# Patient Record
Sex: MALE | Race: WHITE
[De-identification: names, ages, dates, MRNs, and addresses within clinical notes are randomized per-mention and may not be internally consistent; named-entity substitution may affect disease eponyms.]

---

## 2021-03-14 ENCOUNTER — HOSPITAL ENCOUNTER (EMERGENCY)
Dept: HOSPITAL 83 - ED | Age: 61
LOS: 1 days | Discharge: ANOTHER HOSPITAL | End: 2021-03-15
Payer: SELF-PAY

## 2021-03-14 VITALS — HEIGHT: 70 IN | BODY MASS INDEX: 31.5 KG/M2 | WEIGHT: 220 LBS

## 2021-03-14 DIAGNOSIS — Z87.442: ICD-10-CM

## 2021-03-14 DIAGNOSIS — I63.9: Primary | ICD-10-CM

## 2021-03-14 DIAGNOSIS — I48.20: ICD-10-CM

## 2021-03-14 DIAGNOSIS — Z79.899: ICD-10-CM

## 2021-03-14 DIAGNOSIS — Z79.01: ICD-10-CM

## 2021-03-14 DIAGNOSIS — I10: ICD-10-CM

## 2021-03-14 PROBLEM — R29.90 STROKE-LIKE SYMPTOMS: Status: ACTIVE | Noted: 2021-03-14

## 2021-03-14 LAB
ALBUMIN SERPL-MCNC: 3.6 GM/DL (ref 3.1–4.5)
ALP SERPL-CCNC: 71 U/L (ref 45–117)
ALT SERPL W P-5'-P-CCNC: 30 U/L (ref 12–78)
APTT PPP: 24.7 SECONDS (ref 20–32.1)
AST SERPL-CCNC: 19 IU/L (ref 3–35)
BASOPHILS # BLD AUTO: 0 10*3/UL (ref 0–0.1)
BASOPHILS NFR BLD AUTO: 0.5 % (ref 0–1)
BUN SERPL-MCNC: 13 MG/DL (ref 7–24)
CHLORIDE SERPL-SCNC: 108 MMOL/L (ref 98–107)
CREAT SERPL-MCNC: 0.86 MG/DL (ref 0.7–1.3)
EOSINOPHIL # BLD AUTO: 0.2 10*3/UL (ref 0–0.4)
EOSINOPHIL # BLD AUTO: 2.1 % (ref 1–4)
ERYTHROCYTE [DISTWIDTH] IN BLOOD BY AUTOMATED COUNT: 13.2 % (ref 0–14.5)
HCT VFR BLD AUTO: 46.2 % (ref 42–52)
INR BLD: 1 (ref 2–3.5)
LYMPHOCYTES # BLD AUTO: 2.6 10*3/UL (ref 1.3–4.4)
LYMPHOCYTES NFR BLD AUTO: 31.5 % (ref 27–41)
MCH RBC QN AUTO: 29.1 PG (ref 27–31)
MCHC RBC AUTO-ENTMCNC: 32.9 G/DL (ref 33–37)
MCV RBC AUTO: 88.3 FL (ref 80–94)
MONOCYTES # BLD AUTO: 0.7 10*3/UL (ref 0.1–1)
MONOCYTES NFR BLD MANUAL: 8.2 % (ref 3–9)
NEUT #: 4.7 10*3/UL (ref 2.3–7.9)
NEUT %: 57.2 % (ref 47–73)
NRBC BLD QL AUTO: 0 10*3/UL (ref 0–0)
PLATELET # BLD AUTO: 207 10*3/UL (ref 130–400)
PMV BLD AUTO: 9.5 FL (ref 9.6–12.3)
POTASSIUM SERPL-SCNC: 3.7 MMOL/L (ref 3.5–5.1)
PROT SERPL-MCNC: 6.5 GM/DL (ref 6.4–8.2)
RBC # BLD AUTO: 5.23 10*6/UL (ref 4.5–5.9)
SODIUM SERPL-SCNC: 139 MMOL/L (ref 136–145)
TROPONIN I SERPL-MCNC: < 0.015 NG/ML (ref ?–0.04)
WBC NRBC COR # BLD AUTO: 8.3 10*3/UL (ref 4.8–10.8)

## 2021-03-15 ENCOUNTER — HOSPITAL ENCOUNTER (INPATIENT)
Age: 61
LOS: 1 days | Discharge: HOME | DRG: 066 | End: 2021-03-17
Attending: FAMILY MEDICINE | Admitting: INTERNAL MEDICINE

## 2021-03-15 PROCEDURE — G0379 DIRECT REFER HOSPITAL OBSERV: HCPCS

## 2021-03-15 PROCEDURE — G0378 HOSPITAL OBSERVATION PER HR: HCPCS

## 2021-03-15 PROCEDURE — 2580000003 HC RX 258: Performed by: INTERNAL MEDICINE

## 2021-03-15 PROCEDURE — 6370000000 HC RX 637 (ALT 250 FOR IP): Performed by: INTERNAL MEDICINE

## 2021-03-15 RX ORDER — WARFARIN SODIUM 10 MG/1
10 TABLET ORAL DAILY
Status: ON HOLD | COMMUNITY
End: 2021-03-17 | Stop reason: SDUPTHER

## 2021-03-15 RX ORDER — TAMSULOSIN HYDROCHLORIDE 0.4 MG/1
0.4 CAPSULE ORAL NIGHTLY
COMMUNITY

## 2021-03-15 RX ORDER — DILTIAZEM HYDROCHLORIDE 180 MG/1
180 CAPSULE, COATED, EXTENDED RELEASE ORAL DAILY
COMMUNITY

## 2021-03-15 RX ORDER — SODIUM CHLORIDE 0.9 % (FLUSH) 0.9 %
10 SYRINGE (ML) INJECTION PRN
Status: DISCONTINUED | OUTPATIENT
Start: 2021-03-15 | End: 2021-03-17 | Stop reason: HOSPADM

## 2021-03-15 RX ORDER — SODIUM CHLORIDE 0.9 % (FLUSH) 0.9 %
10 SYRINGE (ML) INJECTION EVERY 12 HOURS SCHEDULED
Status: DISCONTINUED | OUTPATIENT
Start: 2021-03-15 | End: 2021-03-17 | Stop reason: HOSPADM

## 2021-03-15 RX ORDER — ONDANSETRON 2 MG/ML
4 INJECTION INTRAMUSCULAR; INTRAVENOUS EVERY 6 HOURS PRN
Status: DISCONTINUED | OUTPATIENT
Start: 2021-03-15 | End: 2021-03-17 | Stop reason: HOSPADM

## 2021-03-15 RX ORDER — CARVEDILOL PHOSPHATE 20 MG/1
20 CAPSULE, EXTENDED RELEASE ORAL DAILY
COMMUNITY

## 2021-03-15 RX ORDER — CARVEDILOL 6.25 MG/1
12.5 TABLET ORAL 2 TIMES DAILY
Status: DISCONTINUED | OUTPATIENT
Start: 2021-03-15 | End: 2021-03-17 | Stop reason: HOSPADM

## 2021-03-15 RX ORDER — ASPIRIN 300 MG/1
300 SUPPOSITORY RECTAL DAILY
Status: DISCONTINUED | OUTPATIENT
Start: 2021-03-15 | End: 2021-03-17 | Stop reason: HOSPADM

## 2021-03-15 RX ORDER — ASPIRIN 81 MG/1
81 TABLET ORAL DAILY
Status: DISCONTINUED | OUTPATIENT
Start: 2021-03-15 | End: 2021-03-17 | Stop reason: HOSPADM

## 2021-03-15 RX ORDER — PROMETHAZINE HYDROCHLORIDE 25 MG/1
12.5 TABLET ORAL EVERY 6 HOURS PRN
Status: DISCONTINUED | OUTPATIENT
Start: 2021-03-15 | End: 2021-03-17 | Stop reason: HOSPADM

## 2021-03-15 RX ORDER — POLYETHYLENE GLYCOL 3350 17 G/17G
17 POWDER, FOR SOLUTION ORAL DAILY PRN
Status: DISCONTINUED | OUTPATIENT
Start: 2021-03-15 | End: 2021-03-17 | Stop reason: HOSPADM

## 2021-03-15 RX ORDER — TAMSULOSIN HYDROCHLORIDE 0.4 MG/1
0.4 CAPSULE ORAL NIGHTLY
Status: DISCONTINUED | OUTPATIENT
Start: 2021-03-15 | End: 2021-03-17 | Stop reason: HOSPADM

## 2021-03-15 RX ORDER — DILTIAZEM HYDROCHLORIDE 180 MG/1
180 CAPSULE, COATED, EXTENDED RELEASE ORAL DAILY
Status: DISCONTINUED | OUTPATIENT
Start: 2021-03-15 | End: 2021-03-17 | Stop reason: HOSPADM

## 2021-03-15 RX ORDER — ATORVASTATIN CALCIUM 80 MG/1
80 TABLET, FILM COATED ORAL NIGHTLY
Status: DISCONTINUED | OUTPATIENT
Start: 2021-03-15 | End: 2021-03-17 | Stop reason: HOSPADM

## 2021-03-15 RX ADMIN — DILTIAZEM HYDROCHLORIDE 180 MG: 180 CAPSULE, EXTENDED RELEASE ORAL at 17:04

## 2021-03-15 RX ADMIN — CARVEDILOL 12.5 MG: 6.25 TABLET, FILM COATED ORAL at 19:56

## 2021-03-15 RX ADMIN — TAMSULOSIN HYDROCHLORIDE 0.4 MG: 0.4 CAPSULE ORAL at 19:56

## 2021-03-15 RX ADMIN — ATORVASTATIN CALCIUM 80 MG: 80 TABLET, FILM COATED ORAL at 19:56

## 2021-03-15 RX ADMIN — ASPIRIN 81 MG: 81 TABLET, COATED ORAL at 17:04

## 2021-03-15 RX ADMIN — SODIUM CHLORIDE, PRESERVATIVE FREE 10 ML: 5 INJECTION INTRAVENOUS at 19:56

## 2021-03-15 ASSESSMENT — PAIN SCALES - GENERAL: PAINLEVEL_OUTOF10: 0

## 2021-03-15 NOTE — PROGRESS NOTES
Dr. Josey Theodore notified via perfect serve regarding new consult, added to care team.    Mary Roberson RN

## 2021-03-15 NOTE — PROGRESS NOTES
Received patient from [facility] at this time. No report received from hospital. RN was informed by EMS that Heparin gtt was running at 13 units/kg/hr. Dr. Rodriguez Peer notified via perfect serve regarding admission orders.     Johan Stroud RN

## 2021-03-15 NOTE — H&P
Hospital Medicine History & Physical      PCP: No primary care provider on file. Date of Admission: 3/15/2021    Date of Service: Pt seen/examined on 3/15/2021 and Admitted to Inpatient with expected LOS greater than two midnights due to medical therapy. Placed in Observation. Chief Complaint: Slurred speech and right mouth droop      History Of Present Illness: 56 y.o. male who presented to [facility] as a transfer from [facility].  Patient had presented there with a complaint of right mouth droop and slurring of his speech. Symptoms started yesterday he says after he went to [facility]. He could not remember the exact time. He denied any weakness in any extremity and admitted to slight numbness of his right lower lip. He had not had such symptoms before. Patient does have A. fib but admits to not being compliant with his Coumadin. Review of times otherwise negative. He says CT of the brain was done over there but he is not sure what the finding was. He was started on heparin drip on account of the A. fib and transferred to [facility] for management of CVA due to stroke. Apparently he was not a TPA candidate at [facility].  Past Medical History:          Diagnosis Date    Atrial fibrillation [facility])        Past Surgical History:      No past surgical history on file. Medications Prior to Admission:      Prior to Admission medications    Medication Sig Start Date End Date Taking?  Authorizing Provider   tamsulosin (FLOMAX) 0.4 MG capsule Take 0.4 mg by mouth nightly   Yes Historical Provider, MD   warfarin (COUMADIN) 10 MG tablet Take 10 mg by mouth daily    Historical Provider, MD   dilTIAZem (CARDIZEM CD) 180 MG extended release capsule Take 180 mg by mouth daily    Historical Provider, MD   carvedilol (COREG CR) 20 MG CP24 extended release capsule Take 20 mg by mouth daily    Historical Provider, MD       Allergies:  Patient has no known allergies. Social History:      The patient currently lives with family    TOBACCO:   reports that he has been smoking cigars. He has never used smokeless tobacco.  ETOH:   has no history on file for alcohol. Family History:       Reviewed in detail and negative for DM, CAD, Cancer, CVA. Positive as follows:    No family history on file. REVIEW OF SYSTEMS:   Pertinent positives as noted in the HPI. All other systems reviewed and negative. PHYSICAL EXAM:    BP (!) 162/125   Pulse 86   Temp 97.5 °F (36.4 °C) (Temporal)   Resp 18   Ht 5' 10\" (1.778 m)   Wt 209 lb 3.2 oz (94.9 kg)   SpO2 97%   BMI 30.02 kg/m²     General appearance:  No apparent distress, appears stated age and cooperative. HEENT:  Normal cephalic, atraumatic without obvious deformity. Pupils equal, round, and reactive to light. Extra ocular muscles intact. Conjunctivae/corneas clear. Neck: Supple, with full range of motion. No jugular venous distention. Trachea midline. Respiratory:  Normal respiratory effort. Clear to auscultation, bilaterally without Rales/Wheezes/Rhonchi. Cardiovascular:  Afib, rate controlled. No murmurs  Abdomen: Soft, non-tender, non-distended with normal bowel sounds. Musculoskeletal:  No clubbing, cyanosis or edema bilaterally. Full range of motion without deformity. Skin: Skin color, texture, turgor normal.  No rashes or lesions. Neurologic:  Neurovascularly intact without any focal sensory/motor deficits. Cranial nerves: II-XII intact, grossly non-focal.  Psychiatric:  Alert and oriented, thought content appropriate, normal insight      Labs:     No results for input(s): WBC, HGB, HCT, PLT in the last 72 hours. No results for input(s): NA, K, CL, CO2, BUN, CREATININE, CALCIUM, PHOS in the last 72 hours. Invalid input(s): MAGNES  No results for input(s): AST, ALT, BILIDIR, BILITOT, ALKPHOS in the last 72 hours. No results for input(s): INR in the last 72 hours.   No results for input(s): Leslykodak Erazo in the last 72 hours.     Urinalysis:    No results found for: NITRU, WBCUA, BACTERIA, RBCUA, BLOODU, SPECGRAV, GLUCOSEU      ASSESSMENT:    Active Hospital Problems    Diagnosis Date Noted    Stroke-like symptoms [R29.90] 03/14/2021   CVA    PLAN:  -Admit to intermediate floor  -neurochecks and monitor NIHSS  -PO aspirin and plavix  -Continue heparin drip; will have to verify insurance information to see if he qualifies for the newer oral anticoagulant because that would help with compliance issues.  -get MRI of the brain  -consult neurology  -PT/OT consult  -speech evaluation; keep NPO until cleared by speech therapy    Afib:  - rate controlled  -not compliant with meds  -on cardizem and metoprolol  -on heparin drip    #BPH: on flomax    DVT Prophylaxis: on heparin drip  Diet: DIET GENERAL; Low Sodium (2 GM)  Code Status: Full Code    Dispo - home when medically stable       Barbara Del Toro MD

## 2021-03-16 ENCOUNTER — APPOINTMENT (OUTPATIENT)
Dept: MRI IMAGING | Age: 61
DRG: 066 | End: 2021-03-16
Attending: FAMILY MEDICINE

## 2021-03-16 ENCOUNTER — APPOINTMENT (OUTPATIENT)
Dept: ULTRASOUND IMAGING | Age: 61
DRG: 066 | End: 2021-03-16
Attending: FAMILY MEDICINE

## 2021-03-16 LAB
CHOLESTEROL, TOTAL: 181 MG/DL (ref 0–199)
HBA1C MFR BLD: 5.7 % (ref 4–5.6)
HCT VFR BLD CALC: 46.5 % (ref 37–54)
HDLC SERPL-MCNC: 38 MG/DL
HEMOGLOBIN: 15.5 G/DL (ref 12.5–16.5)
LDL CHOLESTEROL CALCULATED: 124 MG/DL (ref 0–99)
MCH RBC QN AUTO: 29.2 PG (ref 26–35)
MCHC RBC AUTO-ENTMCNC: 33.3 % (ref 32–34.5)
MCV RBC AUTO: 87.6 FL (ref 80–99.9)
PDW BLD-RTO: 13.4 FL (ref 11.5–15)
PLATELET # BLD: 195 E9/L (ref 130–450)
PMV BLD AUTO: 10 FL (ref 7–12)
RBC # BLD: 5.31 E12/L (ref 3.8–5.8)
TRIGL SERPL-MCNC: 96 MG/DL (ref 0–149)
VLDLC SERPL CALC-MCNC: 19 MG/DL
WBC # BLD: 7.2 E9/L (ref 4.5–11.5)

## 2021-03-16 PROCEDURE — G0378 HOSPITAL OBSERVATION PER HR: HCPCS

## 2021-03-16 PROCEDURE — 70544 MR ANGIOGRAPHY HEAD W/O DYE: CPT

## 2021-03-16 PROCEDURE — 70551 MRI BRAIN STEM W/O DYE: CPT

## 2021-03-16 PROCEDURE — 97110 THERAPEUTIC EXERCISES: CPT

## 2021-03-16 PROCEDURE — 93880 EXTRACRANIAL BILAT STUDY: CPT

## 2021-03-16 PROCEDURE — 80061 LIPID PANEL: CPT

## 2021-03-16 PROCEDURE — 6370000000 HC RX 637 (ALT 250 FOR IP): Performed by: FAMILY MEDICINE

## 2021-03-16 PROCEDURE — 99222 1ST HOSP IP/OBS MODERATE 55: CPT | Performed by: PSYCHIATRY & NEUROLOGY

## 2021-03-16 PROCEDURE — 93880 EXTRACRANIAL BILAT STUDY: CPT | Performed by: RADIOLOGY

## 2021-03-16 PROCEDURE — 85027 COMPLETE CBC AUTOMATED: CPT

## 2021-03-16 PROCEDURE — 36415 COLL VENOUS BLD VENIPUNCTURE: CPT

## 2021-03-16 PROCEDURE — 92523 SPEECH SOUND LANG COMPREHEN: CPT

## 2021-03-16 PROCEDURE — 2580000003 HC RX 258: Performed by: INTERNAL MEDICINE

## 2021-03-16 PROCEDURE — 6370000000 HC RX 637 (ALT 250 FOR IP): Performed by: INTERNAL MEDICINE

## 2021-03-16 PROCEDURE — 97161 PT EVAL LOW COMPLEX 20 MIN: CPT

## 2021-03-16 PROCEDURE — 83036 HEMOGLOBIN GLYCOSYLATED A1C: CPT

## 2021-03-16 RX ORDER — HYDRALAZINE HYDROCHLORIDE 25 MG/1
25 TABLET, FILM COATED ORAL EVERY 8 HOURS SCHEDULED
Status: DISCONTINUED | OUTPATIENT
Start: 2021-03-16 | End: 2021-03-17 | Stop reason: HOSPADM

## 2021-03-16 RX ADMIN — ASPIRIN 81 MG: 81 TABLET, COATED ORAL at 07:54

## 2021-03-16 RX ADMIN — SODIUM CHLORIDE, PRESERVATIVE FREE 10 ML: 5 INJECTION INTRAVENOUS at 07:56

## 2021-03-16 RX ADMIN — SODIUM CHLORIDE, PRESERVATIVE FREE 10 ML: 5 INJECTION INTRAVENOUS at 20:08

## 2021-03-16 RX ADMIN — HYDRALAZINE HYDROCHLORIDE 25 MG: 25 TABLET, FILM COATED ORAL at 20:08

## 2021-03-16 RX ADMIN — HYDRALAZINE HYDROCHLORIDE 25 MG: 25 TABLET, FILM COATED ORAL at 06:30

## 2021-03-16 RX ADMIN — CARVEDILOL 12.5 MG: 6.25 TABLET, FILM COATED ORAL at 20:08

## 2021-03-16 RX ADMIN — HYDRALAZINE HYDROCHLORIDE 25 MG: 25 TABLET, FILM COATED ORAL at 14:05

## 2021-03-16 RX ADMIN — DILTIAZEM HYDROCHLORIDE 180 MG: 180 CAPSULE, EXTENDED RELEASE ORAL at 07:55

## 2021-03-16 RX ADMIN — TAMSULOSIN HYDROCHLORIDE 0.4 MG: 0.4 CAPSULE ORAL at 20:08

## 2021-03-16 RX ADMIN — CARVEDILOL 12.5 MG: 6.25 TABLET, FILM COATED ORAL at 07:55

## 2021-03-16 RX ADMIN — ATORVASTATIN CALCIUM 80 MG: 80 TABLET, FILM COATED ORAL at 20:08

## 2021-03-16 ASSESSMENT — PAIN SCALES - GENERAL: PAINLEVEL_OUTOF10: 0

## 2021-03-16 NOTE — CARE COORDINATION
3/16/21 Transition of Care: patient is alert and oriented. He is observation status. He resides at home alone. 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 He is no longer employed. He states he has the medical [facility] for two more weeks. He does not have a pcp. He does not take his medications as ordered. He has not filled his coumadin in six months. He does drive. He states he needs to go home due to his bills. He is currently awaiting a neurology consult. He is pending an MRI/MRA of head and brain , us carotids, echo. Plan is discharge if all are negative.  Maryellen Garcia RN CM

## 2021-03-16 NOTE — PROGRESS NOTES
Occupational Therapy  Date:3/16/2021  Patient Name: Jas Toribio  MRN: [id number]  : 1960  Room: [address] [place] [address]    OT consult received. Chart reviewed. Education provided regarding the purpose and benefits of skilled OT. Patient states he is at functional baseline with ADLs and functional mobility reporting no concerns regarding return to prior living situation. Therefore, no skilled OT indicated. OT screen only. Will discontinue OT orders. Please re-consult if indicated.  TERRY Pace, OTR/L #[id number]

## 2021-03-16 NOTE — PROGRESS NOTES
SPEECH/LANGUAGE PATHOLOGY  SPEECH/LANGUAGE/COGNITIVE EVALUATION      PATIENT NAME:  Doug Alonso      :  1960          TODAY'S DATE:  3/16/2021 ROOM:  [address] [place] [address]       ADMITTING DIAGNOSIS: Stroke-like symptoms [R29.90]    SPEECH PATHOLOGY DIAGNOSIS:    Mild dysarthria, mild cognitive deficits    THERAPY RECOMMENDATIONS:       Speech Pathology intervention is recommended 3-6 times per week for LOS or when goals are met with emphasis on the following: To Improve Immediate/ STM for provided information and/or use of external memory aid. To Improve oral motor strength and ROM  To Improve speech intelligibility through use of compensatory strategies independently               MOTOR SPEECH       Oral Peripheral Examination   Right labiobuccal weakness    Parameters of Speech Production  Respiration:  Adequate for speech production  Articulation:  Distortion  Resonance:  Within functional limits  Quality:   Within functional limits  Pitch: Within functional limits  Intensity: Within functional limits  Fluency:  Intact  Prosody Intact    RECEPTIVE LANGUAGE    Comprehension of Yes/No Questions:    Within functional limits    Process  Simple Verbal Commands:   Within functional limits  Process Intermediate Verbal Commands:   Within functional limits  Process Complex Verbal Commands:     Within functional limits    Comprehension of Conversation:      Within functional limits      EXPRESSIVE LANGUAGE     Serials: Functional    Imitation:  Words   Functional   Sentences Functional    Naming:  (Modality used:  Verbal)  Confrontation Naming  Functional  Functional Description  Functional  Response Naming: Functional    Conversation:      Conversation was within functional limits    COGNITION     Attention/Orientation  Attention: Sustained attention   Orientation:  Oriented to Person, Place, Date, Reason for hospitalization    Memory   Immediate Recall: Repeated 3/3    Delayed Recall:   Recalled  2/3    Long Term Recall:   Recalled Address, Birthdate, Age and Family           CLINICAL OBSERVATIONS NOTED DURING THE EVALUATION  Within functional limits                  Prognosis for improvements is good  This plan will be re-evaluated and revised in 1 week  if warranted. Patient stated goals: Agreed with above  Treatment goals discussed with Patient   The Patient understand(s) the diagnosis, prognosis and plan of care       CPT code:    67321  eval speech sound lang comprehension        The admitting diagnosis and active problem list, as listed below have been reviewed prior to initiation of this evaluation.         ACTIVE PROBLEM LIST:   Patient Active Problem List   Diagnosis    Stroke-like symptoms

## 2021-03-16 NOTE — PROGRESS NOTES
Physical Therapy    Physical Therapy Initial Assessment     Name: Finesse Mcdonald  : 1960  MRN: [id number]    Referring Provider:  Molly Dominguez MD    Date of Service: 3/16/2021    Evaluating PT:  Tammy Chery PT, DPT    Room #:  1854/1300-T  Diagnosis:  Stroke like symptoms  PMHx/PSHx:  A-fib  Procedure/Surgery:  NA  Precautions:  Falls  Equipment Needs:  None    SUBJECTIVE:    Pt lives alone in a 1 story home with 3 stairs to enter and no rail(s). Bed is on 1st floor and bath is on 1st floor. Basement laundry with flight and no rail(s). Pt ambulated with no AD PTA. Pt reported independent with ADLs. Pt is actively driving. OBJECTIVE:   Initial Evaluation  Date: 3/16/2021 Treatment  NA   AM-PAC 6 Clicks 19/28    Was pt agreeable to Eval/treatment? Yes     Does pt have pain? No c/o pain. Bed Mobility  Rolling: Independent  Supine to sit: Independent  Sit to supine: Independent  Scooting: Independent    Transfers Sit to stand: Independent  Stand to sit: Independent  Stand pivot: Independent    Ambulation    200 feet with no AD Independent    Stair negotiation: ascended and descended  NT    ROM BUE:  WFL  BLE:  WFL    Strength BUE:  5/5  BLE:  5/5    Balance Sitting EOB:  Independent  Dynamic Standing:  Independent      Pt is A & O x 3, mild word finding deficits noted. Sensation:  Pt denied numbness and tingling  Edema:  None noted    Vitals:  Blood Pressure at rest - Blood Pressure post session -   Heart Rate at rest - Heart Rate post session -   SPO2 at rest - SPO2 post session -     Therapeutic Exercises:  NT    Patient education  Pt educated on purpose of PT assessment, importance of mobility, safety with mobility, transfers, gait    Patient response to education:   Pt verbalized understanding Pt demonstrated skill Pt requires further education in this area   Yes  Yes  No      ASSESSMENT:    Comments:  Patient cleared by RN and agreeable to treatment.  Patient found seated in the bedside chair and reported being up ad med in the room. Patient demonstrated safe/steady transfers and gait. Patient with navas gait speed, equal stride length and steady on his feet. Denied dizziness with head turns. Patient assisted self back to seated in the chair with call light and tray table in reach. Patient with mild slurred speech, word finding deficits, and right mouth droop and discussed outpatient speech therapy services. Patient voiced understanding. Patient with no acute PT needs. Pt's/ family goals   1. To go home. Patient and or family understand(s) diagnosis, prognosis, and plan of care. Yes     PLAN OF CARE:    Current Treatment Recommendations     [] Strengthening     [] ROM   [] Balance Training   [] Endurance Training   [] Transfer Training   [] Gait Training   [] Stair Training   [] Positioning   [] Safety and Education Training   [] Patient/Caregiver Education   [] HEP  [x] Other No acute PT needs      PT orders will be discontinued as patient with no acute PT needs. Patient able to perform functional tasks assessed with good safety and with supervision or independently. Thank you for the opportunity to assist in the care of this patient. Time in  1130  Time out  1138    Total Treatment Time  0 minutes     Evaluation Time includes thorough review of current medical information, gathering information on past medical history/social history and prior level of function, completion of standardized testing/informal observation of tasks, assessment of data and education on plan of care and goals.     CPT codes:  [x] Low Complexity PT evaluation 61433  [] Moderate Complexity PT evaluation 08808  [] High Complexity PT evaluation 77691  [] PT Re-evaluation 70583  [] Gait training 45879 - minutes  [] Manual therapy 70583 - minutes  [] Therapeutic activities 42062 - minutes  [] Therapeutic exercises 52991 - minutes  [] Neuromuscular reeducation 45717 - minutes     Aidee Wilkerson, PT, DPT  License [id number]

## 2021-03-16 NOTE — CONSULTS
[facility]  Neurology Consult    Date:  3/16/2021  Patient Name:  Karina Mukherjee  YOB: 1960  MRN: [id number]     PCP:  No primary care provider on file. Referring:  Sanjeev Cherry DO      Chief Complaint: right face weakness and slurred speech    History obtained from: patient    Assessment  Karina Mukherjee is a 56 y.o. male with a history of atrial fibrillation, HTN, and medication non-compliance. His symptoms are concerning for an acute ischemic stroke. Appearance on MRI with infarct in the left internal capsule would be more suggestive of small vessel disease than a cardioembolic etiology. Plan  · CUS  · ECHO  · Continue ASA   · OK to resume anticoagulation from neuro perspective  · Continue statin therapy  · Will follow        History of Present Illness:  Karina Mukherjee is a 56 y.o. right handed male presenting for evaluation of stroke. On Sunday he had had some right facial droop which got worse and some slurred speech. The right facial weakness took about a day before it resolved. He noticed some numbness also over the right face during this period, this is now gone. He also noted his BP was quite high during this period too. 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 He does have a history of HTN. No DM2. Was not on ASA prior to admission. No history of stroke. Smokes a couple cigars per day. LDL: 124  HbA1c: 5.7    Review of Systems:  As per HPI    Medical History:   Past Medical History:   Diagnosis Date    Atrial fibrillation [facility])         Surgical History:   No past surgical history on file. Family History:   No family history on file.       Social History:  Social History     Tobacco Use    Smoking status: Current Every Day Smoker     Types: Cigars    Smokeless tobacco: Never Used   Substance Use Topics    Alcohol use: Not on file    Drug use: Not on file Current Medications:      Current Facility-Administered Medications   Medication Dose Route Frequency Provider Last Rate Last Admin    hydrALAZINE (APRESOLINE) tablet 25 mg  25 mg Oral 3 times per day Tonya Gilmore MD   25 mg at 03/16/21 0630    carvedilol (COREG) tablet 12.5 mg  12.5 mg Oral BID Monserrat Vang MD   12.5 mg at 03/16/21 0755    dilTIAZem (CARDIZEM CD) extended release capsule 180 mg  180 mg Oral Daily Monserrat Vang MD   180 mg at 03/16/21 0755    tamsulosin (FLOMAX) capsule 0.4 mg  0.4 mg Oral Nightly Monserrat Vang MD   0.4 mg at 03/15/21 1956    sodium chloride flush 0.9 % injection 10 mL  10 mL Intravenous 2 times per day Monserrat Vang MD   10 mL at 03/16/21 0756    sodium chloride flush 0.9 % injection 10 mL  10 mL Intravenous PRN Monserrat Vang MD        promethazine (PHENERGAN) tablet 12.5 mg  12.5 mg Oral Q6H PRN Monserrat Vang MD        Or    ondansetron (ZOFRAN) injection 4 mg  4 mg Intravenous Q6H PRN Monserrat Vang MD        polyethylene glycol (GLYCOLAX) packet 17 g  17 g Oral Daily PRN Monserrat Vang MD        aspirin EC tablet 81 mg  81 mg Oral Daily Monserrat Vang MD   81 mg at 03/16/21 3441    Or    aspirin suppository 300 mg  300 mg Rectal Daily Monserrat Vang MD        perflutren lipid microspheres (DEFINITY) injection 1.65 mg  1.5 mL Intravenous ONCE PRN Monserrat Vang MD        atorvastatin (LIPITOR) tablet 80 mg  80 mg Oral Nightly Monserrat Vang MD   80 mg at 03/15/21 1956        Allergies:      No Known Allergies     Physical Examination  Vitals   Vitals:    03/15/21 1953 03/15/21 2213 03/16/21 0530 03/16/21 0745   BP: (!) 173/110 (!) 151/108 (!) 154/110 (!) 145/103   Pulse: 78 79 81 89   Resp: 16  16 16   Temp: 97.6 °F (36.4 °C)  97.7 °F (36.5 °C) 97.8 °F (36.6 °C)   TempSrc: Temporal  Temporal Temporal   SpO2: 96%   94%   Weight:       Height:            General: Patient appears in no acute distress with an overweight body habitus  HEENT: Normocephalic, atraumatic Chest: no respiratory distress noted  Extremities: No edema or cyanosis noted    Neurologic Examination    Mental Status  Alert, and oriented to person, place and time with normal speech and language. No evidence of aphasia during conversation. No evidence of memory impairment. Attention and concentration appeared normal.     Cranial Nerves  II. Visual fields full to confrontation bilaterally. Fundoscopic exam: Discs sharp bilaterally  III, IV, VI: Pupils equally round and reactive to light, 3 to 2 mm bilaterally. EOMs: full, no nystagmus. V. Facial sensation intact to light touch bilaterally  VII: Facial movements with right sided lower facial weakness  VIII: Hearing intact to voice  IX,X: Palate elevates symmetrically. Mild dysarthria  XI: Sternocleidomastoid and trapezius 5/5 bilaterally   XII: Tongue is midline    Motor     Right Left   Right Left   Deltoid 5 5  Hip Flexion 5 5   Biceps      5  5  Knee Extension 5 5   Triceps 5 5  Knee Flexion 5 5   Handgrip 5 5  Ankle Dorsiflexion 5 5       Ankle Plantarflexion 5 5     Tone: Normal in all four limbs    Bulk: Normal in all four limbs with no evidence of atrophy    Sensation  · Light Touch: Intact distally in all four limbs  · Pinprick: Intact distally in all four limbs  · Vibration: Intact distally in all four limbs  · Proprioception: Intact distally in all four limbs    Reflexes     Right Left   Biceps 2 2   Brachioradialis 2 2   Triceps 2 2   Patellar 2 2   Achilles 2 2   ankle clonus none none     Toes down going bilaterally. Coordination  Rapid alternating movements normal in bilateral upper extremities  Finger to nose testing normal bilaterally  Heel to shin testing normal bilaterally    Gait  Normal base, stride, and arm swing with casual gait. 2-3 steps to turn. Normal tandem gait.    Romberg test negative      Labs  Recent labs reviewed    Imaging  MRI Brain and MRA head 3/16/2021  Impression       Acute infarction involving the posterior limb of

## 2021-03-16 NOTE — PROGRESS NOTES
left internal capsule. MRA of the head and neck showed is unremarkable findings. No aneurysm. No hemodynamically significant stenosis. Echo pending. Continue Aspirin and high intensity statin. Allergy team further input is much appreciated. 2. Hx of atrial fibrillation: Given patient history of CVA, prediabetes and hypertension he is a likely candidate for long-term anticoagulation. I will defer that to patient primary cardiologist.  3. Prediabetes: A1c 5.7.  lifestyle modification to avoid progression to diabetes mellitus. 4. Essential HTN:on Cardizem, Coreg and also Hydralazine. Monitor the vital sign. 5. BPH: Continue Flomax  6. DVT Prophylaxis:Lovenox. DISPOSITION: Expect possible discharge in a day or 2    Medications:  REVIEWED DAILY    Infusion Medications   Scheduled Medications    hydrALAZINE  25 mg Oral 3 times per day    carvedilol  12.5 mg Oral BID    dilTIAZem  180 mg Oral Daily    tamsulosin  0.4 mg Oral Nightly    sodium chloride flush  10 mL Intravenous 2 times per day    aspirin  81 mg Oral Daily    Or    aspirin  300 mg Rectal Daily    atorvastatin  80 mg Oral Nightly     PRN Meds: sodium chloride flush, promethazine **OR** ondansetron, polyethylene glycol, perflutren lipid microspheres    Labs:     Recent Labs     03/16/21  0646   WBC 7.2   HGB 15.5   HCT 46.5          No results for input(s): NA, K, CL, CO2, BUN, CREATININE, CALCIUM, PHOS in the last 72 hours. Invalid input(s): MAGNES    No results for input(s): PROT, ALB, ALKPHOS, ALT, AST, BILITOT, AMYLASE, LIPASE in the last 72 hours. No results for input(s): INR in the last 72 hours. No results for input(s): Tavares Rodriguez in the last 72 hours.     Chronic labs:    Lab Results   Component Value Date    CHOL 181 03/16/2021    TRIG 96 03/16/2021    HDL 38 03/16/2021    LDLCALC 124 (H) 03/16/2021    LABA1C 5.7 (H) 03/16/2021       Radiology: REVIEWED DAILY +++++++++++++++++++++++++++++++++++++++++++++++++  Lisa [address]  +++++++++++++++++++++++++++++++++++++++++++++++++  NOTE: This report was transcribed using voice recognition software. Every effort was made to ensure accuracy; however, inadvertent computerized transcription errors may be present.

## 2021-03-17 VITALS
BODY MASS INDEX: 29.95 KG/M2 | TEMPERATURE: 97.8 F | RESPIRATION RATE: 18 BRPM | WEIGHT: 209.2 LBS | OXYGEN SATURATION: 96 % | HEIGHT: 70 IN | DIASTOLIC BLOOD PRESSURE: 93 MMHG | SYSTOLIC BLOOD PRESSURE: 154 MMHG | HEART RATE: 67 BPM

## 2021-03-17 PROBLEM — I63.9 ACUTE CVA (CEREBROVASCULAR ACCIDENT) (HCC): Status: ACTIVE | Noted: 2021-03-17

## 2021-03-17 PROCEDURE — 97129 THER IVNTJ 1ST 15 MIN: CPT

## 2021-03-17 PROCEDURE — 99232 SBSQ HOSP IP/OBS MODERATE 35: CPT | Performed by: PHYSICIAN ASSISTANT

## 2021-03-17 PROCEDURE — 6370000000 HC RX 637 (ALT 250 FOR IP): Performed by: INTERNAL MEDICINE

## 2021-03-17 PROCEDURE — 6370000000 HC RX 637 (ALT 250 FOR IP): Performed by: FAMILY MEDICINE

## 2021-03-17 PROCEDURE — 97110 THERAPEUTIC EXERCISES: CPT

## 2021-03-17 PROCEDURE — 2580000003 HC RX 258: Performed by: INTERNAL MEDICINE

## 2021-03-17 PROCEDURE — G0378 HOSPITAL OBSERVATION PER HR: HCPCS

## 2021-03-17 PROCEDURE — 1200000000 HC SEMI PRIVATE

## 2021-03-17 RX ORDER — WARFARIN SODIUM 10 MG/1
10 TABLET ORAL DAILY
Qty: 30 TABLET | Refills: 0 | Status: SHIPPED | OUTPATIENT
Start: 2021-03-17

## 2021-03-17 RX ORDER — HYDRALAZINE HYDROCHLORIDE 50 MG/1
50 TABLET, FILM COATED ORAL 3 TIMES DAILY
Qty: 90 TABLET | Refills: 2 | Status: SHIPPED | OUTPATIENT
Start: 2021-03-17

## 2021-03-17 RX ORDER — ATORVASTATIN CALCIUM 80 MG/1
80 TABLET, FILM COATED ORAL NIGHTLY
Qty: 30 TABLET | Refills: 3 | Status: SHIPPED | OUTPATIENT
Start: 2021-03-17 | End: 2021-03-17

## 2021-03-17 RX ORDER — ASPIRIN 81 MG/1
81 TABLET ORAL DAILY
Qty: 30 TABLET | Refills: 3 | Status: SHIPPED | OUTPATIENT
Start: 2021-03-18 | End: 2021-03-17 | Stop reason: HOSPADM

## 2021-03-17 RX ORDER — ATORVASTATIN CALCIUM 80 MG/1
80 TABLET, FILM COATED ORAL NIGHTLY
Qty: 30 TABLET | Refills: 3 | Status: SHIPPED | OUTPATIENT
Start: 2021-03-17

## 2021-03-17 RX ORDER — HYDRALAZINE HYDROCHLORIDE 50 MG/1
50 TABLET, FILM COATED ORAL 3 TIMES DAILY
Qty: 90 TABLET | Refills: 2 | Status: SHIPPED | OUTPATIENT
Start: 2021-03-17 | End: 2021-03-17

## 2021-03-17 RX ORDER — WARFARIN SODIUM 10 MG/1
10 TABLET ORAL DAILY
Qty: 30 TABLET | Refills: 0
Start: 2021-03-17 | End: 2021-03-17 | Stop reason: SDUPTHER

## 2021-03-17 RX ADMIN — CARVEDILOL 12.5 MG: 6.25 TABLET, FILM COATED ORAL at 08:12

## 2021-03-17 RX ADMIN — HYDRALAZINE HYDROCHLORIDE 25 MG: 25 TABLET, FILM COATED ORAL at 05:30

## 2021-03-17 RX ADMIN — SODIUM CHLORIDE, PRESERVATIVE FREE 10 ML: 5 INJECTION INTRAVENOUS at 08:15

## 2021-03-17 RX ADMIN — ASPIRIN 81 MG: 81 TABLET, COATED ORAL at 08:12

## 2021-03-17 RX ADMIN — DILTIAZEM HYDROCHLORIDE 180 MG: 180 CAPSULE, EXTENDED RELEASE ORAL at 08:13

## 2021-03-17 RX ADMIN — HYDRALAZINE HYDROCHLORIDE 25 MG: 25 TABLET, FILM COATED ORAL at 13:37

## 2021-03-17 ASSESSMENT — PAIN SCALES - GENERAL: PAINLEVEL_OUTOF10: 0

## 2021-03-17 NOTE — CARE COORDINATION
3/17/21 Update CM Note: Patient made inpatient due to positive MRI for acute infarction. US carotids negative. Echo is pending completion. Patient is up ambulating in the room. Per Neuro he is to resume his coumadin. He denies pain. BP elevated during the night and improved this am.  Family is at the bedside. Will await pcp as patient is asking to discharge to home. PT/OT eval 24/24.  Jesús Whiting RN CM

## 2021-03-17 NOTE — PROGRESS NOTES
Rickey De Santiago is a 56 y.o.  male     Neurology is following for stroke     PMH significant for Afib, HTN    He presented with R facial droop, R facial numbness and slurred speech. He had been off of his Coumadin due to insurance reasons and also noted that his BP was running quite high during this time as well. He does smoke a couple cigars per day. MRI of the brain showed a stroke in the L internal capsule. Carotid US with atherosclerotic disease but no significant stenosis. An Echo is pending. He has some remaining dysarthria and R facial droop. No other complaints.      ROS otherwise negative     Wife at bedside     Current Facility-Administered Medications   Medication Dose Route Frequency Provider Last Rate Last Admin    hydrALAZINE (APRESOLINE) tablet 25 mg  25 mg Oral 3 times per day Armando Sarkar MD   25 mg at 03/17/21 1337    carvedilol (COREG) tablet 12.5 mg  12.5 mg Oral BID Diana Robledo MD   12.5 mg at 03/17/21 5079    dilTIAZem (CARDIZEM CD) extended release capsule 180 mg  180 mg Oral Daily Diana Robledo MD   180 mg at 03/17/21 0813    tamsulosin (FLOMAX) capsule 0.4 mg  0.4 mg Oral Nightly Diana Robledo MD   0.4 mg at 03/16/21 2008    sodium chloride flush 0.9 % injection 10 mL  10 mL Intravenous 2 times per day Diana Robledo MD   10 mL at 03/17/21 0815    sodium chloride flush 0.9 % injection 10 mL  10 mL Intravenous PRN Diana Robledo MD        promethazine (PHENERGAN) tablet 12.5 mg  12.5 mg Oral Q6H PRN Diana Robledo MD        Or    ondansetron (ZOFRAN) injection 4 mg  4 mg Intravenous Q6H PRN Diana Robledo MD        polyethylene glycol (GLYCOLAX) packet 17 g  17 g Oral Daily PRN Diana Robledo MD        aspirin EC tablet 81 mg  81 mg Oral Daily Diana Robledo MD   81 mg at 03/17/21 4115    Or    aspirin suppository 300 mg  300 mg Rectal Daily Diana Robledo MD        perflutren lipid microspheres (DEFINITY) injection 1.65 mg  1.5 mL Intravenous ONCE PRN Diana Robledo MD  atorvastatin (LIPITOR) tablet 80 mg  80 mg Oral Nightly Jadyn Silvestre MD   80 mg at 03/16/21 2008         Objective:       BP (!) 135/94   Pulse 67   Temp 97.8 °F (36.6 °C) (Temporal)   Resp 18   Ht 5' 10\" (1.778 m)   Wt 209 lb 3.2 oz (94.9 kg)   SpO2 96%   BMI 30.02 kg/m²        General appearance: alert, appears stated age and cooperative  Head: Normocephalic, without obvious abnormality, atraumatic  Eyes: conjunctivae/corneas clear. Neck: Supple.  No carotid bruit   Lungs: clear to auscultation bilaterally  Heart: regular rate and rhythm, S1, S2 normal, no murmur, click, rub or gallop  Extremities: extremities normal, atraumatic, no cyanosis or edema  Pulses: 2+ and symmetric  Skin: Skin color, texture, turgor normal. No rashes or lesions     Mental Status: Alert, oriented, thought content appropriate     Appropriate attention/concentration  Intact fundus of knowledge  Repetition intact  Intact memories    Speech: Clear   Language: Appropriate     Cranial Nerves:  I: smell    II: visual acuity     II: visual fields Full to confrontation   II: pupils DARIO   III,VII: ptosis None   III,IV,VI: extraocular muscles  Full ROM   V: mastication Normal   V: facial light touch sensation  Normal   V,VII: corneal reflex     VII: facial muscle function - upper  Normal   VII: facial muscle function - lower Normal   VIII: hearing Normal   IX: soft palate elevation  Normal   IX,X: gag reflex    XI: trapezius strength  5/5   XI: sternocleidomastoid strength 5/5   XI: neck extension strength  5/5   XII: tongue strength  Normal     Motor:  5/5 throughout   Normal tone and bulk   No abnormal movements     Sensory:  LT normal in all extremities     Coordination:   FNF, FFM, HTS intact     DTR:     No Babinskis  No Joe's    No pathological reflexes    Laboratory/Radiology:     CBC with Differential:    Lab Results   Component Value Date    WBC 7.2 03/16/2021    RBC 5.31 03/16/2021    HGB 15.5 03/16/2021    HCT 46.5 03/16/2021     03/16/2021    MCV 87.6 03/16/2021    MCH 29.2 03/16/2021    MCHC 33.3 03/16/2021    RDW 13.4 03/16/2021     HgBA1c:    Lab Results   Component Value Date    LABA1C 5.7 03/16/2021     FLP:    Lab Results   Component Value Date    TRIG 96 03/16/2021    HDL 38 03/16/2021    LDLCALC 124 03/16/2021    LABVLDL 19 03/16/2021     MRI brain  Acute infarction involving the posterior limb of left internal capsule.     There is no acute intracranial hemorrhage, or intracranial mass lesion.     Mild chronic microangiopathic ischemic disease.     Mild generalized volume loss.     Unremarkable MRA of the head.  No aneurysm.  No hemodynamically significant  Stenosis. MRA head    Acute infarction involving the posterior limb of left internal capsule.     There is no acute intracranial hemorrhage, or intracranial mass lesion.     Mild chronic microangiopathic ischemic disease.     Mild generalized volume loss.     Unremarkable MRA of the head.  No aneurysm.  No hemodynamically significant  stenosis. Carotid US  Atherosclerotic disease. No hemodynamically significant stenosis is  identified  Estimated stenosis by NASCET criteria in the proximal right carotid  artery is between 0% and 49%. Estimated stenosis by NASCET criteria in the proximal left carotid  artery is between 0% and 49%. I personally reviewed all labs and images today   Assessment:     Acute stroke in the L internal capsule   Mechanism likely small vessel from vascular risk factors -- ASA was added this admission for secondary prevention    Less likely cardio-embolic due to location, however being off of his coumadin, he would be at risk for embolic events    Plan:     Continue coumadin, ASA and high intensity statin     F/u echo -- though this does not need to delay his discharge, if it is not going to be completed today it can be done as OP.  He is already on maximum medical therapy for stroke prevention     Risk factor control     Stroke education     Follow up stroke clinic    Okay for d/c from neurology pov pending echo-- again, if this is going to delay d/c, can be done as OP     Neuro will sign off, please call with questions or new issues     Rob Mcleod PA-C  1:25 PM  3/17/2021

## 2021-03-17 NOTE — DISCHARGE SUMMARY
Hospitalist Discharge Summary    Patient ID: Rickey De Santiago   Patient : 1960  Patient's PCP: No primary care provider on file. Admit Date: 3/15/2021   Admitting Physician: Renee Mcconnell MD    Discharge Date:  3/17/2021  Discharge Physician: Bravo Britt MD   Discharge Condition: Stable  Discharge Disposition: Home    History of presenting illness:  Patient admitted on 3/15/2021 of complaint of slurred speech and right facial droop. Patient reportedly had past medical history of A. fib and was not on any medication. Patient was transferred from [facility].  Patient had CT of the head at [facility] but the result of which was not known. Patient then transferred to here for further work-up. MRI of the brain showed acute infarction involving the posterior limb of left internal capsule. MRA of the head and neck showed is unremarkable findings. No aneurysm. No hemodynamically significant stenosis. Echo was ordered but was not done. Neurology team indicated that echo can be done as an outpatient. Neurology team indicated that patient can be discharged on Coumadin along with statin. Patient was discharged on Coumadin per neurology team recommendation. During this admission patient was also found to have elevated blood pressure. In addition to his home antihypertensive medication upon discharge he was prescribed Hydralazine. Prior to discharge patient was seen and examined. Patient stated that the slurring of speech resolved. No complaint of headache. No visual changes. No complaint of numbness and tingling. No upper or lower extremity weakness. Overall he stated that he had remarkable progress to the extent that he demanded to be discharged home. Patient was discharged in a stable condition with advice to continue his follow-up with his primary care physician and neurology team.  He was also informed that to complete the echo study as an outpatient.   Patient was also informed that to come to the emergency department in event he has any concerning symptoms. Hospital course in brief:  (Please refer to daily progress notes for a comprehensive review of the hospitalization by requesting medical records)      Consults:   IP CONSULT TO NEUROLOGY    Discharge Diagnoses:  1. CVA  2. Paroxysmal atrial fibrillation  3. Prediabetes  4. Essential hypertension  5. BPH  Discharge Instructions / Follow up:    Continued appropriate risk factor modification of blood pressure, diabetes and serum lipids will remain essential to reducing risk of future atherosclerotic development    Activity: activity as tolerated    Significant labs:  CBC:   Recent Labs     03/16/21  0646   WBC 7.2   RBC 5.31   HGB 15.5   HCT 46.5   MCV 87.6   RDW 13.4        BMP: No results for input(s): NA, K, CL, CO2, BUN, CREATININE, MG, PHOS in the last 72 hours. Invalid input(s): CA  LFT:  No results for input(s): PROT, ALB, ALKPHOS, ALT, AST, BILITOT, AMYLASE, LIPASE in the last 72 hours. PT/INR: No results for input(s): INR, APTT in the last 72 hours. BNP: No results for input(s): BNP in the last 72 hours. Hgb A1C:   Lab Results   Component Value Date    LABA1C 5.7 (H) 03/16/2021     Folate and B12: No results found for: Maltese Ovi, No results found for: FOLATE  Thyroid Studies: No results found for: TSH, [id number], D0NNZZD, THYROIDAB    Urinalysis:  No results found for: NITRU, WBCUA, BACTERIA, RBCUA, BLOODU, SPECGRAV, GLUCOSEU    Imaging:  Mra Head Wo Contrast    Result Date: 3/16/2021  EXAMINATION: MRI OF THE BRAIN WITHOUT CONTRAST; MRA OF THE HEAD WITHOUT CONTRAST 3/16/2021 3:00 pm TECHNIQUE: Multiplanar multisequence MRI of the brain was performed without the administration of intravenous contrast.; MRA of the head was performed utilizing time-of-flight imaging with MIP images. No intravenous contrast was administered.  COMPARISON: None HISTORY: ORDERING SYSTEM PROVIDED HISTORY: right facial droop, dysarthria TECHNOLOGIST PROVIDED HISTORY: Reason for exam:->right facial droop, dysarthria What reading provider will be dictating this exam?->[facility] FINDINGS: Head MRI: 9 mm focus of restricted diffusion involving the posterior limb of left internal capsule consistent with acute infarction. There is no acute intracranial hemorrhage, or intracranial mass lesion. No mass effect, midline shift, or extra-axial collection is noted. There are mild nonspecific foci of periventricular and subcortical cerebral white matter T2/FLAIR hyperintensity, most likely representing chronic microangiopathic disease in this age group. The brain parenchyma is otherwise normal. The pituitary gland is normal in appearance. The cerebellar tonsils are in normal position. The ventricles, sulci, and cisterns are prominent suggestive of generalized volume loss. The intracranial flow voids are preserved. The globes and orbits are within normal limits. The visualized extracranial structures including paranasal sinuses and mastoid air cells are unremarkable. 2.2 cm subgaleal lipoma of left occipital region is noted. MRA head: ANTERIOR CIRCULATION: No significant stenosis of the intracranial internal carotid, anterior cerebral, or middle cerebral arteries. POSTERIOR CIRCULATION: No significant stenosis of the vertebral, basilar, or posterior cerebral arteries. Acute infarction involving the posterior limb of left internal capsule. There is no acute intracranial hemorrhage, or intracranial mass lesion. Mild chronic microangiopathic ischemic disease. Mild generalized volume loss. Unremarkable MRA of the head. No aneurysm. No hemodynamically significant stenosis. The findings were sent to the Radiology Results [id number] at 4:01 pm on 3/16/2021to be communicated to a licensed caregiver.      Mri Brain Wo Contrast    Result Date: 3/16/2021  EXAMINATION: MRI OF THE BRAIN WITHOUT CONTRAST; MRA OF THE HEAD WITHOUT CONTRAST 3/16/2021 3:00 pm TECHNIQUE: Multiplanar multisequence MRI of the brain was performed without the administration of intravenous contrast.; MRA of the head was performed utilizing time-of-flight imaging with MIP images. No intravenous contrast was administered. COMPARISON: None HISTORY: ORDERING SYSTEM PROVIDED HISTORY: right facial droop, dysarthria TECHNOLOGIST PROVIDED HISTORY: Reason for exam:->right facial droop, dysarthria What reading provider will be dictating this exam?->[facility] FINDINGS: Head MRI: 9 mm focus of restricted diffusion involving the posterior limb of left internal capsule consistent with acute infarction. There is no acute intracranial hemorrhage, or intracranial mass lesion. No mass effect, midline shift, or extra-axial collection is noted. There are mild nonspecific foci of periventricular and subcortical cerebral white matter T2/FLAIR hyperintensity, most likely representing chronic microangiopathic disease in this age group. The brain parenchyma is otherwise normal. The pituitary gland is normal in appearance. The cerebellar tonsils are in normal position. The ventricles, sulci, and cisterns are prominent suggestive of generalized volume loss. The intracranial flow voids are preserved. The globes and orbits are within normal limits. The visualized extracranial structures including paranasal sinuses and mastoid air cells are unremarkable. 2.2 cm subgaleal lipoma of left occipital region is noted. MRA head: ANTERIOR CIRCULATION: No significant stenosis of the intracranial internal carotid, anterior cerebral, or middle cerebral arteries. POSTERIOR CIRCULATION: No significant stenosis of the vertebral, basilar, or posterior cerebral arteries. Acute infarction involving the posterior limb of left internal capsule. There is no acute intracranial hemorrhage, or intracranial mass lesion. Mild chronic microangiopathic ischemic disease. Mild generalized volume loss. Unremarkable MRA of the head. No aneurysm. No hemodynamically significant stenosis. The findings were sent to the Radiology Results [id number] at 4:01 pm on 3/16/2021to be communicated to a licensed caregiver. Us Carotid Artery Bilateral    Result Date: 3/16/2021  Patient MRN:  [id number] : 1960 Age: 56 years Gender: Male Order Date:  3/16/2021 3:30 PM EXAM: US CAROTID ARTERY BILATERAL NUMBER OF IMAGES:  55 INDICATION:  R ICA stenosis R ICA stenosis What reading provider will be dictating this exam?->[facility] COMPARISON: None FINDINGS: Velocities are within normal limits ICA\CCA ratios are  within normal limits The right vertebral artery doppler images demonstrate  antegrade flow. The left vertebral artery doppler images demonstrate  antegrade flow. Grey scale images demonstrate mild plaque identified in the right and left carotid arteries. Atherosclerotic disease. No hemodynamically significant stenosis is identified Estimated stenosis by NASCET criteria in the proximal right carotid artery is between 0% and 49%. Estimated stenosis by NASCET criteria in the proximal left carotid artery is between 0% and 49%.        Discharge Medications:      Medication List      START taking these medications    aspirin 81 MG EC tablet  Take 1 tablet by mouth daily  Start taking on: 2021     atorvastatin 80 MG tablet  Commonly known as: LIPITOR  Take 1 tablet by mouth nightly     hydrALAZINE 50 MG tablet  Commonly known as: APRESOLINE  Take 1 tablet by mouth 3 times daily        CHANGE how you take these medications    warfarin 10 MG tablet  Commonly known as: COUMADIN  Take 1 tablet by mouth daily This to be continued in the near future after assessment by his PCP and his Cardiologist.  What changed: additional instructions        CONTINUE taking these medications    Cardizem  MG extended release capsule  Generic drug: dilTIAZem     Coreg CR 20 MG Cp24 extended release capsule  Generic drug: carvedilol     tamsulosin 0.4 MG capsule Commonly known as: FLOMAX           Where to Get Your Medications      Information about where to get these medications is not yet available    Ask your nurse or doctor about these medications  · aspirin 81 MG EC tablet  · atorvastatin 80 MG tablet  · hydrALAZINE 50 MG tablet  · warfarin 10 MG tablet         Time Spent on discharge is more than 35 minutes in the examination, evaluation, counseling and review of medications and discharge plan.    +++++++++++++++++++++++++++++++++++++++++++++++++  [place], [place]  +++++++++++++++++++++++++++++++++++++++++++++++++  NOTE: This report was transcribed using voice recognition software. Every effort was made to ensure accuracy; however, inadvertent computerized transcription errors may be present.

## 2021-03-17 NOTE — PROGRESS NOTES
Pt. And wife both given and explained all discharge instructions all questions answered. Stressed the importance of all follow up appts and compliance with medication. Pt understands and is setting up PCP independently tomorrow for follow up.

## 2021-03-17 NOTE — CARE COORDINATION
3/17/21 Update CM Note: Per financial patient is eligible for HFA but is not eligible for help with medications or medicaid due to over the income.  Yessica Burnham RN CM

## 2021-06-29 ENCOUNTER — HOSPITAL ENCOUNTER (INPATIENT)
Dept: HOSPITAL 83 - ED | Age: 61
LOS: 2 days | Discharge: HOME | DRG: 351 | End: 2021-07-01
Attending: INTERNAL MEDICINE | Admitting: INTERNAL MEDICINE
Payer: SELF-PAY

## 2021-06-29 VITALS — DIASTOLIC BLOOD PRESSURE: 92 MMHG

## 2021-06-29 VITALS — HEIGHT: 70 IN | WEIGHT: 191.38 LBS | BODY MASS INDEX: 27.4 KG/M2

## 2021-06-29 DIAGNOSIS — Z79.01: ICD-10-CM

## 2021-06-29 DIAGNOSIS — F17.290: ICD-10-CM

## 2021-06-29 DIAGNOSIS — E83.39: ICD-10-CM

## 2021-06-29 DIAGNOSIS — D72.829: ICD-10-CM

## 2021-06-29 DIAGNOSIS — I10: ICD-10-CM

## 2021-06-29 DIAGNOSIS — R73.9: ICD-10-CM

## 2021-06-29 DIAGNOSIS — E87.8: ICD-10-CM

## 2021-06-29 DIAGNOSIS — Z82.49: ICD-10-CM

## 2021-06-29 DIAGNOSIS — E87.6: ICD-10-CM

## 2021-06-29 DIAGNOSIS — E83.41: ICD-10-CM

## 2021-06-29 DIAGNOSIS — I48.91: ICD-10-CM

## 2021-06-29 DIAGNOSIS — Z83.3: ICD-10-CM

## 2021-06-29 DIAGNOSIS — D50.9: ICD-10-CM

## 2021-06-29 DIAGNOSIS — E80.6: ICD-10-CM

## 2021-06-29 DIAGNOSIS — K40.30: Primary | ICD-10-CM

## 2021-06-29 DIAGNOSIS — Z86.73: ICD-10-CM

## 2021-06-29 DIAGNOSIS — Z79.899: ICD-10-CM

## 2021-06-29 DIAGNOSIS — I48.20: ICD-10-CM

## 2021-06-29 DIAGNOSIS — E78.5: ICD-10-CM

## 2021-06-29 LAB
ALBUMIN SERPL-MCNC: 3.7 GM/DL (ref 3.1–4.5)
ALP SERPL-CCNC: 74 U/L (ref 45–117)
ALT SERPL W P-5'-P-CCNC: 34 U/L (ref 12–78)
AST SERPL-CCNC: 20 IU/L (ref 3–35)
BASOPHILS # BLD AUTO: 0 10*3/UL (ref 0–0.1)
BASOPHILS NFR BLD AUTO: 0.4 % (ref 0–1)
BUN SERPL-MCNC: 6 MG/DL (ref 7–24)
CHLORIDE SERPL-SCNC: 108 MMOL/L (ref 98–107)
CREAT SERPL-MCNC: 0.61 MG/DL (ref 0.7–1.3)
EOSINOPHIL # BLD AUTO: 0 10*3/UL (ref 0–0.4)
EOSINOPHIL # BLD AUTO: 0.1 % (ref 1–4)
ERYTHROCYTE [DISTWIDTH] IN BLOOD BY AUTOMATED COUNT: 13.9 % (ref 0–14.5)
HCT VFR BLD AUTO: 42.7 % (ref 42–52)
INR BLD: 1.5 (ref 2–3.5)
LIPASE SERPL-CCNC: 64 U/L (ref 73–393)
LYMPHOCYTES # BLD AUTO: 1.4 10*3/UL (ref 1.3–4.4)
LYMPHOCYTES NFR BLD AUTO: 15.4 % (ref 27–41)
MCH RBC QN AUTO: 29.6 PG (ref 27–31)
MCHC RBC AUTO-ENTMCNC: 33.7 G/DL (ref 33–37)
MCV RBC AUTO: 87.9 FL (ref 80–94)
MONOCYTES # BLD AUTO: 0.6 10*3/UL (ref 0.1–1)
MONOCYTES NFR BLD MANUAL: 6.4 % (ref 3–9)
NEUT #: 7.1 10*3/UL (ref 2.3–7.9)
NEUT %: 77.5 % (ref 47–73)
NRBC BLD QL AUTO: 0 10*3/UL (ref 0–0)
PLATELET # BLD AUTO: 179 10*3/UL (ref 130–400)
PMV BLD AUTO: 9.4 FL (ref 9.6–12.3)
POTASSIUM SERPL-SCNC: 3.4 MMOL/L (ref 3.5–5.1)
PROT SERPL-MCNC: 7.2 GM/DL (ref 6.4–8.2)
RBC # BLD AUTO: 4.86 10*6/UL (ref 4.5–5.9)
SODIUM SERPL-SCNC: 144 MMOL/L (ref 136–145)
WBC NRBC COR # BLD AUTO: 9.1 10*3/UL (ref 4.8–10.8)

## 2021-06-29 PROCEDURE — 0D9670Z DRAINAGE OF STOMACH WITH DRAINAGE DEVICE, VIA NATURAL OR ARTIFICIAL OPENING: ICD-10-PCS

## 2021-06-30 VITALS — SYSTOLIC BLOOD PRESSURE: 132 MMHG | DIASTOLIC BLOOD PRESSURE: 98 MMHG

## 2021-06-30 VITALS — DIASTOLIC BLOOD PRESSURE: 90 MMHG | SYSTOLIC BLOOD PRESSURE: 163 MMHG

## 2021-06-30 VITALS — SYSTOLIC BLOOD PRESSURE: 150 MMHG | DIASTOLIC BLOOD PRESSURE: 88 MMHG

## 2021-06-30 VITALS — SYSTOLIC BLOOD PRESSURE: 155 MMHG | DIASTOLIC BLOOD PRESSURE: 88 MMHG

## 2021-06-30 VITALS — SYSTOLIC BLOOD PRESSURE: 157 MMHG | DIASTOLIC BLOOD PRESSURE: 110 MMHG

## 2021-06-30 VITALS — DIASTOLIC BLOOD PRESSURE: 88 MMHG

## 2021-06-30 VITALS — DIASTOLIC BLOOD PRESSURE: 63 MMHG

## 2021-06-30 VITALS — DIASTOLIC BLOOD PRESSURE: 97 MMHG | SYSTOLIC BLOOD PRESSURE: 150 MMHG

## 2021-06-30 VITALS — DIASTOLIC BLOOD PRESSURE: 89 MMHG

## 2021-06-30 VITALS — DIASTOLIC BLOOD PRESSURE: 76 MMHG

## 2021-06-30 LAB
25(OH)D3 SERPL-MCNC: 24.3 NG/ML (ref 30–100)
ALBUMIN SERPL-MCNC: 4.1 GM/DL (ref 3.1–4.5)
ALP SERPL-CCNC: 85 U/L (ref 45–117)
ALT SERPL W P-5'-P-CCNC: 35 U/L (ref 12–78)
AST SERPL-CCNC: 24 IU/L (ref 3–35)
BASOPHILS # BLD AUTO: 0 10*3/UL (ref 0–0.1)
BASOPHILS NFR BLD AUTO: 0.3 % (ref 0–1)
BUN SERPL-MCNC: 6 MG/DL (ref 7–24)
CHLORIDE SERPL-SCNC: 104 MMOL/L (ref 98–107)
CHOLEST SERPL-MCNC: 131 MG/DL (ref ?–200)
CREAT SERPL-MCNC: 0.67 MG/DL (ref 0.7–1.3)
EOSINOPHIL # BLD AUTO: 0 10*3/UL (ref 0–0.4)
EOSINOPHIL # BLD AUTO: 0.2 % (ref 1–4)
ERYTHROCYTE [DISTWIDTH] IN BLOOD BY AUTOMATED COUNT: 14.2 % (ref 0–14.5)
HCT VFR BLD AUTO: 48.8 % (ref 42–52)
LDLC SERPL DIRECT ASSAY-MCNC: 77 MG/DL (ref 9–159)
LYMPHOCYTES # BLD AUTO: 1.9 10*3/UL (ref 1.3–4.4)
LYMPHOCYTES NFR BLD AUTO: 14.7 % (ref 27–41)
MCH RBC QN AUTO: 29.2 PG (ref 27–31)
MCHC RBC AUTO-ENTMCNC: 32.6 G/DL (ref 33–37)
MCV RBC AUTO: 89.7 FL (ref 80–94)
MONOCYTES # BLD AUTO: 1 10*3/UL (ref 0.1–1)
MONOCYTES NFR BLD MANUAL: 8.1 % (ref 3–9)
NEUT #: 9.7 10*3/UL (ref 2.3–7.9)
NEUT %: 76.3 % (ref 47–73)
NRBC BLD QL AUTO: 0 % (ref 0–0)
PLATELET # BLD AUTO: 210 10*3/UL (ref 130–400)
PMV BLD AUTO: 9.8 FL (ref 9.6–12.3)
POTASSIUM SERPL-SCNC: 3.6 MMOL/L (ref 3.5–5.1)
PROT SERPL-MCNC: 7.6 GM/DL (ref 6.4–8.2)
RBC # BLD AUTO: 5.44 10*6/UL (ref 4.5–5.9)
SODIUM SERPL-SCNC: 141 MMOL/L (ref 136–145)
TRIGL SERPL-MCNC: 66 MG/DL (ref ?–150)
TSH SERPL DL<=0.005 MIU/L-ACNC: 1.34 UIU/ML (ref 0.36–4.75)
VITAMIN B12: 804 PG/ML (ref 247–911)
WBC NRBC COR # BLD AUTO: 12.7 10*3/UL (ref 4.8–10.8)

## 2021-06-30 PROCEDURE — 3E0T3BZ INTRODUCTION OF ANESTHETIC AGENT INTO PERIPHERAL NERVES AND PLEXI, PERCUTANEOUS APPROACH: ICD-10-PCS | Performed by: SURGERY

## 2021-06-30 PROCEDURE — 0YU54JZ SUPPLEMENT RIGHT INGUINAL REGION WITH SYNTHETIC SUBSTITUTE, PERCUTANEOUS ENDOSCOPIC APPROACH: ICD-10-PCS | Performed by: SURGERY

## 2021-07-01 VITALS — SYSTOLIC BLOOD PRESSURE: 111 MMHG | DIASTOLIC BLOOD PRESSURE: 65 MMHG

## 2021-07-01 VITALS — DIASTOLIC BLOOD PRESSURE: 80 MMHG

## 2021-07-01 LAB
BUN SERPL-MCNC: 11 MG/DL (ref 7–24)
CHLORIDE SERPL-SCNC: 104 MMOL/L (ref 98–107)
CREAT SERPL-MCNC: 0.62 MG/DL (ref 0.7–1.3)
ERYTHROCYTE [DISTWIDTH] IN BLOOD BY AUTOMATED COUNT: 13.4 % (ref 0–14.5)
HCT VFR BLD AUTO: 39.9 % (ref 42–52)
MCH RBC QN AUTO: 29.1 PG (ref 27–31)
MCHC RBC AUTO-ENTMCNC: 32.8 G/DL (ref 33–37)
MCV RBC AUTO: 88.7 FL (ref 80–94)
NRBC BLD QL AUTO: 0 % (ref 0–0)
OVALOCYTES BLD QL SMEAR: (no result)
PLATELET # BLD AUTO: 172 10*3/UL (ref 130–400)
PLATELET SUFFICIENCY: NORMAL
PMV BLD AUTO: 9.8 FL (ref 9.6–12.3)
POTASSIUM SERPL-SCNC: 3.6 MMOL/L (ref 3.5–5.1)
RBC # BLD AUTO: 4.5 10*6/UL (ref 4.5–5.9)
SODIUM SERPL-SCNC: 136 MMOL/L (ref 136–145)
TOTAL CELLS COUNTED: 100 #CELLS
WBC NRBC COR # BLD AUTO: 14.7 10*3/UL (ref 4.8–10.8)